# Patient Record
Sex: MALE | Race: BLACK OR AFRICAN AMERICAN | Employment: OTHER | URBAN - METROPOLITAN AREA
[De-identification: names, ages, dates, MRNs, and addresses within clinical notes are randomized per-mention and may not be internally consistent; named-entity substitution may affect disease eponyms.]

---

## 2017-05-11 PROBLEM — M47.14: Status: ACTIVE | Noted: 2017-05-11

## 2017-05-14 PROBLEM — Z96.653 STATUS POST BILATERAL KNEE REPLACEMENTS: Chronic | Status: ACTIVE | Noted: 2017-05-14

## 2017-05-14 PROBLEM — D69.6 THROMBOCYTOPENIA (HCC): Status: ACTIVE | Noted: 2017-05-14

## 2017-05-14 PROBLEM — M48.061 LUMBAR SPINAL STENOSIS: Chronic | Status: ACTIVE | Noted: 2017-05-14

## 2017-05-14 PROBLEM — M19.90 DJD (DEGENERATIVE JOINT DISEASE): Chronic | Status: ACTIVE | Noted: 2017-05-14

## 2017-05-14 PROBLEM — I25.10 CAD (CORONARY ARTERY DISEASE): Chronic | Status: ACTIVE | Noted: 2017-05-14

## 2018-01-01 ENCOUNTER — OFFICE VISIT (OUTPATIENT)
Dept: SURGERY | Age: 83
End: 2018-01-01

## 2018-01-01 ENCOUNTER — TELEPHONE (OUTPATIENT)
Dept: SURGERY | Age: 83
End: 2018-01-01

## 2018-01-01 VITALS
WEIGHT: 175.2 LBS | TEMPERATURE: 95.8 F | HEIGHT: 74 IN | RESPIRATION RATE: 16 BRPM | OXYGEN SATURATION: 100 % | SYSTOLIC BLOOD PRESSURE: 104 MMHG | DIASTOLIC BLOOD PRESSURE: 66 MMHG | HEART RATE: 69 BPM | BODY MASS INDEX: 22.48 KG/M2

## 2018-01-01 DIAGNOSIS — I10 ESSENTIAL HYPERTENSION: ICD-10-CM

## 2018-01-01 DIAGNOSIS — N62 GYNECOMASTIA, MALE: Primary | ICD-10-CM

## 2018-01-01 DIAGNOSIS — K21.9 GASTROESOPHAGEAL REFLUX DISEASE WITHOUT ESOPHAGITIS: ICD-10-CM

## 2018-01-01 RX ORDER — ACETAMINOPHEN 325 MG/1
TABLET ORAL
COMMUNITY

## 2018-01-01 RX ORDER — ALBUTEROL SULFATE 90 UG/1
AEROSOL, METERED RESPIRATORY (INHALATION)
COMMUNITY

## 2018-02-20 NOTE — COMMUNICATION BODY
Dear Kellen Grossman came to the office today for evaluation of the right breast mass he recently noted one month ago. Thank you for your kind referral.    As you know he first became aware of this about a month ago and noticed some itching and slight tenderness in the right nipple area. Then he was able to feel a small lump. The soreness has resolved. The itching is better. He believes that the lump has gotten somewhat smaller. He has no history of any similar problem. He has not been taking any hormones but he is on some medications that could potentially cause gynecomastianamely Proscar. On examination this is a smooth approximately 3 cm subareolar mass that is slightly tender but easily movable without overlying skin changes. There are no masses on the left. Clinically I think this does represent gynecomastia rather than cancer. I have ordered a mammogram of this area. Since his symptoms are improving and he believes the area is gotten smaller we may elect to observe this if the mammogram does not show any suspicious findings. The other option would be a needle biopsy versus excision but for now he seems to feel that is getting better and is not bothering him so much. Thank you again for allowing me to work with you in his care.     With kindest regards,    Justina Jackson MD

## 2018-02-20 NOTE — PATIENT INSTRUCTIONS
If you have any questions or concerns about today's appointment, the verbal and/or written instructions you were given for follow up care, please call our office at [phone].     [facility] - [address]    [phone] office  [phone]

## 2018-02-20 NOTE — PROGRESS NOTES
History of present illness    Mora Gaming comes to the office today for evaluation of a mass in his right breast.  He was recently seeing his primary care physician and mentioned a mass in his right breast.  This is been present for about a month. He noticed some itching and slight soreness. The soreness has resolved. He believes the masses gotten smaller and has tended to bother him less. He has no history of trauma to the breast and no history of any previous breast problems. He has no family history of breast cancer. The patient is not taking any hormones but he is on Proscar, which has been associated with gynecomastia, for BPH.     Allergies   Allergen Reactions    Fish Derived Other (comments)     Intolerance    Other Medication Rash and Itching     Grass    Pollen Extracts Other (comments)     Nasal symptoms     Past Medical History:   Diagnosis Date    Asthma     BPH (benign prostatic hyperplasia)     BPH (benign prostatic hypertrophy)     Chest pain     CHF (congestive heart failure) (HCC)     Chronic airway obstruction, not elsewhere classified 11/10/2011    Chronic diastolic heart failure (HCC)     Chronic systolic heart failure (HCC)     COPD     Coronary atherosclerosis of native coronary artery     Essential hypertension, benign     GERD (gastroesophageal reflux disease)     HCVD (hypertensive cardiovascular disease)     Hypercholesterolemia     Hypertension     Hypertrophy of prostate without urinary obstruction and other lower urinary tract symptoms (LUTS)     Incomplete bladder emptying     Kidney stones     Microscopic hematuria     Other and unspecified hyperlipidemia     Personal history of tobacco use, presenting hazards to health     SOB (shortness of breath)     Tachycardia     Transfusion history     Urinary retention     Wide-complex tachycardia ([address]      Past Surgical History:   Procedure Laterality Date    ENDOSCOPY, COLON, DIAGNOSTIC  6/3/11 normal    HX LUMBAR LAMINECTOMY      HX ORTHOPAEDIC      both knees replaced    HX PACEMAKER  3/12     Social History     Social History    Marital status:      Spouse name: N/A    Number of children: N/A    Years of education: N/A     Social History Main Topics    Smoking status: Former Smoker     Quit date: 5/2/1987    Smokeless tobacco: Never Used      Comment: Quit in 1987    Alcohol use 3.5 oz/week     7 drink(s) per week      Comment: occasionally; formerly was a moderate user    Drug use: No    Sexual activity: Not Asked     Other Topics Concern    None     Social History Narrative     REVIEW OF SYSTEMS     Constitutional: No fever, weight loss, fatigue or recent chills. Skin:  No recent rashes, dermatitis or abnormal moles. HEENT:  No changes in vision, vertigo, epistaxis, dysphasia, or hoarseness. History of glaucoma     Cardiac:  No chest pain, palpitations, or edema. History of congestive heart failure hypertension and hypercholesterolemia. Respiratory: No chronic cough, shortness of breath, wheezing, hemoptysis, or history of sleep apnea. History of COPD/asthma     Breasts/GYN:  See the history of present illness     Gastrointestinal:  No significant food intolerances, no recent vomiting, no chronic abdominal pain, no change in bowel habits, no melena. History of GERD. Genitourinary:  No history of hematuria, dysuria, frequency, or stress urinary incontinence. No nocturia. Patient does have a history of BPH and is on medications to help his voiding     Musculoskeletal: No weakness, joint pains, or arthritis. Endocrine:  No history of thyroid disease. No diabetes. Lymph/hemo:  No history of blood transfusions or easy bruising. No anemia. Neuro: No dizziness or headaches or fainting.     PHYSICAL EXAM    Visit Vitals    /66 (BP 1 Location: Left arm, BP Patient Position: Sitting)    Pulse 69    Temp 95.8 °F (35.4 °C) (Oral)    Resp 16    Ht 6' 2\" (1.88 m)    Wt 79.5 kg (175 lb 3.2 oz)    SpO2 100%    BMI 22.49 kg/m2          Constitutional:  Well-developed, well-nourished, no acute distress. Patient is slow to move and has to have help standing up from the wheelchair. He seems well oriented and answers questions appropriately. Head:  Head, eyes, ears, nose, throat within normal limits. Skin:  No suspicious moles or rashes. Neck:  No masses or adenopathy. The airway appears normal. Thyroid is not enlarged and there are no palpable thyroid nodules. Lungs:  Lungs are clear to auscultation and percussion. No respiratory distress. No chest wall tenderness. Patient has mild inspiratory and expiratory wheezes     Heart:  Heart is regular with no extra heart sounds or murmur heard. Breast Exam: The breasts are free of any discrete tenderness or masses except in the right breast where he has a centrally located subareolar firm mass that is movable without any overlying skin changes. It is minimally tender. It measures about 3 cm. The skin and nipple areas appear normal otherwise. Both axillae are negative. Abdomen: The abdomen is soft and nontender without organomegaly or masses. Bowel sounds are active and of normal pitch. There is no abdominal distention. Testicles seem atrophic     Extremities:  No tenderness of the extremities and no significant swelling. Psych:  Alert and oriented. Assessment: Unilateral right breast lump clinically consistent with unilateral gynecomastia. This could possibly be related to some of his medications including Proscar. #2 COPD/asthma #3 hypertension. #4 history of congestive heart failure. #5 BPH    Recommendations: I recommend that we get an mammogram of this area to see if the findings are consistent with gynecomastia. Otherwise am not ordering any other blood work at the present time. Once we get the mammogram I will talk with the patient    The above was dictated with Anca. There may be unrecognized errors.     Aracelis Rodriguez MD

## 2018-02-20 NOTE — PROGRESS NOTES
David Mccauley is a 80 y.o. male who presents for a surgical evaluation of right breast mass. Last mammogram: never    Breast pain? Never, just itchy    Do you do self breast exams? no    Do you feel any abnormal areas on your breast(s)? no    Do you have any nipple discharge/drainage? Color? no    Any discoloration of the skin on/around breast? no    Consume caffeine? yes                                    How much? 1 cup per day    Menarche age: N/A    When was your last menstrual cycle? Start of menopause? Birth control: How long? Type:    :                         Para:                            Abortions:    Age of first pregnancy:    Breast feed? Months total:    Fam hx of breast ca? No        Relation:                              Age dx:     Fam: hx of ovarian ca? no              Relation:                              Age dx:    Pt hx or breast or ovarian ca? no               Age of dx?

## 2018-02-20 NOTE — MR AVS SNAPSHOT
[address] [address] 
[phone] Patient: Pattie Rayo MRN: [id number] Visit Information Date & Time Provider Department Dept. Phone Encounter #  
 2/20/2018  3:00 PM MD [facility] [phone] [id number]  
  
 11/16/2018  9:15 AM  
Any with Mayte Keen MD  
[facility] ([address]) Appt Note: Return in about 1 year (around 11/17/2018) for urinary retention. Day[address] [place] [id number]  
[address] [facility] [address] [facility] [address] [facility] [address] Upcoming Health Maintenance Date Due DTaP/Tdap/Td series (1 - Tdap) 3/3/1951 ZOSTER VACCINE AGE 60> 1/3/1990 GLAUCOMA SCREENING Q2Y 3/3/1995 Pneumococcal 65+ Low/Medium Risk (1 of 2 - PCV13) 3/3/1995 MEDICARE YEARLY EXAM 3/3/1995 Influenza Age 5 to Adult 8/1/2017 Allergies as of 2/20/2018  Review Complete On: 2/20/2018 By: Olman Anders MD  
  
 Severity Noted Reaction Type Reactions Fish Derived  08/24/2012    Other (comments) Intolerance Other Medication  08/24/2012    Rash, Itching Grass Pollen Extracts  08/24/2012    Other (comments) Nasal symptoms Current Immunizations  Never Reviewed No immunizations on file. Not reviewed this visit You Were Diagnosed With   
  
 Codes Comments Gynecomastia, male    -  Primary ICD-10-CM: N62 
ICD-9-CM: 611.1 Essential hypertension     ICD-10-CM: I10 
ICD-9-CM: 401.9 Gastroesophageal reflux disease without esophagitis     ICD-10-CM: K21.9 ICD-9-CM: 530.81 Vitals BP Pulse Temp Resp Height(growth percentile) Weight(growth percentile) 104/66 (BP 1 Location: Left arm, BP Patient Position: Sitting) 69 95.8 °F (35.4 °C) (Oral) 16 6' 2\" (1.88 m) 175 lb 3.2 oz (79.5 kg) SpO2 BMI Smoking Status 100% 22.49 kg/m2 Former Smoker BMI and BSA Data Body Mass Index Body Surface Area  
 22.49 kg/m 2 2.04 m 2 Preferred Pharmacy Pharmacy Name Phone [facility] [address] [place] [phone] Your Updated Medication List  
  
   
This list is accurate as of: 2/20/18  4:03 PM.  Always use your most recent med list.  
  
  
  
  
 acetaminophen 325 mg tablet Commonly known as:  TYLENOL Take  by mouth every four (4) hours as needed for Pain. ADVAIR DISKUS 100-50 mcg/dose diskus inhaler Generic drug:  fluticasone-salmeterol Take 1 Puff by inhalation daily as needed. albuterol 90 mcg/actuation inhaler Commonly known as:  PROVENTIL HFA, VENTOLIN HFA, PROAIR HFA Take  by inhalation. albuterol-ipratropium 2.5 mg-0.5 mg/3 ml Nebu Commonly known as:  DUO-NEB  
3 mL by Nebulization route every six (6) hours as needed. aspirin 325 mg tablet Commonly known as:  ASPIRIN Take 81 mg by mouth daily. bisacodyl 5 mg EC tablet Commonly known as:  DULCOLAX Take 1 Tab by mouth daily as needed for Constipation. bumetanide 1 mg tablet Commonly known as:  Dorotha Darian Take  by mouth daily. Citracal + D tablet Generic drug:  calcium citrate-vitamin D3 Take 1 Tab by mouth two (2) times a day. dorzolamide 2 % ophthalmic solution Commonly known as:  TRUSOPT Administer 2 Drops to right eye two (2) times a day. ferrous sulfate 325 mg (65 mg iron) tablet Take 325 mg by mouth Daily (before breakfast). FLOMAX 0.4 mg capsule Generic drug:  tamsulosin Take 0.4 mg by mouth daily. fluocinoNIDE 0.05 % topical cream  
Commonly known as:  LIDEX Apply  to affected area two (2) times a day.  
  
 garlic 3,802 mg Cap Take 1 Tab by mouth daily as needed. HYDROcodone-acetaminophen  mg tablet Commonly known as:  Regine Byrd  
 Take 1-2 Tabs by mouth every four (4) hours as needed. Max Daily Amount: 12 Tabs. KLOR-CON M20 20 mEq tablet Generic drug:  potassium chloride Take 20 mEq by mouth daily. LUMIGAN 0.01 % ophthalmic drops Generic drug:  bimatoprost  
Administer 1 Drop to both eyes every evening. methocarbamol 750 mg tablet Commonly known as:  ROBAXIN Take 1 Tab by mouth every eight (8) hours as needed. minocycline 100 mg capsule Commonly known as:  Senora Lacy Take 100 mg by mouth two (2) times a day. omeprazole 40 mg capsule Commonly known as:  PRILOSEC Take 40 mg by mouth daily. ondansetron 4 mg disintegrating tablet Commonly known as:  ZOFRAN ODT Take 1 Tab by mouth every six (6) hours as needed. PROSCAR 5 mg tablet Generic drug:  finasteride Take 5 mg by mouth nightly. quinapril 40 mg tablet Commonly known as:  ACCUPRIL Take 1 Tab by mouth nightly. simvastatin 40 mg tablet Commonly known as:  ZOCOR Take 1 Tab by mouth nightly. forcholesterol  
  
 timolol 0.5 % ophthalmic solution Commonly known as:  TIMOPTIC Administer 1 Drop to right eye two (2) times a day. To-Do List   
 02/20/2018 Imaging:  LASHAWN 3D ANDI W MAMMO RT DX INCL CAD Patient Instructions If you have any questions or concerns about today's appointment, the verbal and/or written instructions you were given for follow up care, please call our office at [phone]. [facility] - [address] 
 
[phone] office [phone] Introducing [facility]! [employer] introduces [employer] patient portal. Now you can access parts of your medical record, email your doctor's office, and request medication refills online. 1. In your internet browser, go to [email or web address] 2. Click on the First Time User? Click Here link in the Sign In box. You will see the New Member Sign Up page. 3. Enter your [employer] Access Code exactly as it appears below. You will not need to use this code after youve completed the sign-up process. If you do not sign up before the expiration date, you must request a new code. · [employer] Access Code: [email or web address] Expires: 5/21/2018  2:16 PM 
 
4. Enter the last four digits of your Social Security Number (xxxx) and Date of Birth (mm/dd/yyyy) as indicated and click Submit. You will be taken to the next sign-up page. 5. Create a [employer] ID. This will be your [employer] login ID and cannot be changed, so think of one that is secure and easy to remember. 6. Create a [employer] password. You can change your password at any time. 7. Enter your Password Reset Question and Answer. This can be used at a later time if you forget your password. 8. Enter your e-mail address. You will receive e-mail notification when new information is available in [address] 9. Click Sign Up. You can now view and download portions of your medical record. 10. Click the Download Summary menu link to download a portable copy of your medical information. If you have questions, please visit the Frequently Asked Questions section of the [employer] website. Remember, [employer] is NOT to be used for urgent needs. For medical emergencies, dial 911. Now available from your iPhone and Android! Please provide this summary of care documentation to your next provider. Your primary care clinician is listed as BERTHA RAVI. If you have any questions after today's visit, please call [phone].